# Patient Record
Sex: MALE | Race: BLACK OR AFRICAN AMERICAN
[De-identification: names, ages, dates, MRNs, and addresses within clinical notes are randomized per-mention and may not be internally consistent; named-entity substitution may affect disease eponyms.]

---

## 2019-08-03 ENCOUNTER — HOSPITAL ENCOUNTER (EMERGENCY)
Dept: HOSPITAL 97 - ER | Age: 39
Discharge: HOME | End: 2019-08-03
Payer: COMMERCIAL

## 2019-08-03 DIAGNOSIS — T78.40XA: ICD-10-CM

## 2019-08-03 DIAGNOSIS — R22.9: Primary | ICD-10-CM

## 2019-08-03 PROCEDURE — 96372 THER/PROPH/DIAG INJ SC/IM: CPT

## 2019-08-03 PROCEDURE — 99283 EMERGENCY DEPT VISIT LOW MDM: CPT

## 2019-08-03 NOTE — ER
Nurse's Notes                                                                                     

 [facility]                                                                 

Name: Medhat Salmon                                                                              

Age: 39 yrs                                                                                       

Sex: Male                                                                                         

: 1980                                                                                   

MRN: [id number]                                                                                   

Arrival Date: 2019                                                                          

Time: 20:33                                                                                       

Account#: [id number]                                                                            

Bed 14                                                                                            

Private MD:                                                                                       

Diagnosis: Allergic Reaction                                                                      

                                                                                                  

Presentation:                                                                                     

                                                                                             

20:37 Presenting complaint: Patient states: Bilateral eye itching and swelling that started   aj  

      just PTA. Transition of care: patient was not received from another setting of care.        

      Onset of symptoms was 2019. Risk Assessment: Do you want to hurt yourself or     

      someone else? Patient reports no desire to harm self or others. Initial Sepsis Screen:      

      Does the patient meet any 2 criteria? No. Patient's initial sepsis screen is negative.      

      Does the patient have a suspected source of infection? No. Patient's initial sepsis         

      screen is negative. Care prior to arrival: None.                                            

20:37 Method Of Arrival: Ambulatory                                                           aj  

20:37 Acuity: MAMTA 5                                                                           aj  

                                                                                                  

Triage Assessment:                                                                                

20:38 General: Appears in no apparent distress. comfortable, Behavior is calm, cooperative,   aj  

      appropriate for age. Pain: Denies pain. EENT: Lid(s) swollen bottom lid bilaterally.        

      Neuro: Level of Consciousness is awake, alert, obeys commands, Oriented to person,          

      place, time, situation, Appropriate for age. Respiratory: Airway is patent Trachea          

      midline Respiratory effort is even, unlabored, Respiratory pattern is regular,              

      symmetrical. Derm: Skin is intact, is healthy with good turgor, Skin is pink, warm \T\      

      dry. normal.                                                                                

                                                                                                  

Historical:                                                                                       

- Allergies:                                                                                      

20:38 No Known Allergies;                                                                     aj  

- Home Meds:                                                                                      

20:38 None [Active];                                                                          aj  

- PMHx:                                                                                           

20:38 None;                                                                                   aj  

- PSHx:                                                                                           

20:38 None;                                                                                   aj  

                                                                                                  

- Immunization history:: Adult Immunizations up to date.                                          

- Social history:: Smoking status: Patient/guardian denies using tobacco.                         

- Ebola Screening: : Patient negative for fever greater than or equal to 101.5 degrees            

  Fahrenheit, and additional compatible Ebola Virus Disease symptoms Patient denies               

  exposure to infectious person Patient denies travel to an Ebola-affected area in the            

  21 days before illness onset No symptoms or risks identified at this time.                      

                                                                                                  

                                                                                                  

Screenin:45 Abuse screen: Denies threats or abuse. Denies injuries from another. Nutritional        rr5 

      screening: No deficits noted. Tuberculosis screening: No symptoms or risk factors           

      identified. Fall Risk None identified. Total Ellis Fall Scale indicates No Risk (0-24       

      pts).                                                                                       

                                                                                                  

Assessment:                                                                                       

21:05 General: Appears in no apparent distress. uncomfortable, Behavior is calm, cooperative, rr5 

      appropriate for age. Pain: Denies pain. Neuro: Level of Consciousness is awake, alert,      

      obeys commands, Oriented to person, place, time, situation, Appropriate for age.            

      Cardiovascular: Capillary refill < 3 seconds Patient's skin is warm and dry.                

21:05 Respiratory: Airway is patent Respiratory effort is even, unlabored, Respiratory        rr5 

      pattern is regular, symmetrical, Denies shortness of breath. GI: No signs and/or            

      symptoms were reported involving the gastrointestinal system. : No signs and/or           

      symptoms were reported regarding the genitourinary system. EENT: Eyes swelling both         

      eyes. Derm: Skin is intact, Skin temperature is warm. Musculoskeletal: Circulation,         

      motion, and sensation intact. Capillary refill < 3 seconds.                                 

21:58 Reassessment: Patient appears in no apparent distress at this time. Patient is alert,   rr5 

      oriented x 3, equal unlabored respirations, skin warm/dry/pink. able to open eyes more      

      widely. denies any  or SOB. discharge instruction given and explained without            

      complaints made. Patient states symptoms have improved.                                     

                                                                                                  

Vital Signs:                                                                                      

20:38  / 75; Pulse 97; Resp 20; Temp 98.6; Pulse Ox 98% on R/A; Weight 117.93 kg;       aj  

      Height 5 ft. 8 in. (172.72 cm);                                                             

21:55  / 70; Pulse 90; Resp 16; Pulse Ox 99% on R/A;                                    rr5 

20:38 Body Mass Index 39.53 (117.93 kg, 172.72 cm)                                            aj  

                                                                                                  

ED Course:                                                                                        

20:33 Patient arrived in ED.                                                                  es  

20:38 Triage completed.                                                                       aj  

20:38 Arm band placed on right wrist. Patient placed in an exam room.                         aj  

20:40 Patient has correct armband on for positive identification. Bed in low position. Call   rr5 

      light in reach.                                                                             

20:57 Dex Hendrix PA is PHCP.                                                               [id number] 

20:57 Tomas Pineda MD is Attending Physician.                                             jr8 

21:06 Eliot Blanca RN is Primary Nurse.                                                    rr5 

22:01 No provider procedures requiring assistance completed. Patient did not have IV access   rr5 

      during this emergency room visit.                                                           

                                                                                                  

Administered Medications:                                                                         

21:28 Drug: Benadryl 50 mg Route: PO;                                                         rr5 

22:00 Follow up: Response: No adverse reaction                                                rr5 

21:28 Drug: SOLU-Medrol 125 mg Route: IM; Site: left gluteus;                                 rr5 

22:00 Follow up: Response: No adverse reaction                                                rr5 

                                                                                                  

                                                                                                  

Outcome:                                                                                          

21:39 Discharge ordered by MD.                                                                savannah 

22:00 Patient left the ED.                                                                    rr5 

22:00 Discharged to home ambulatory, with family.                                             rr5 

22:00 Condition: stable                                                                           

22:00 Discharge instructions given to patient, Instructed on discharge instructions, follow       

      up and referral plans. medication usage, Demonstrated understanding of instructions,        

      follow-up care, medications, Prescriptions given X 1.                                       

                                                                                                  

Signatures:                                                                                       

Mel Laboy, RN                       Sabine Ivy Josh, PA PA   jr8                                                  

Eliot Blanca RN                      RN   rr5                                                  

                                                                                                  

**************************************************************************************************

## 2019-08-03 NOTE — EDPHYS
Physician Documentation                                                                           

 [facility]                                                                 

Name: Medhat Salmon                                                                              

Age: 39 yrs                                                                                       

Sex: Male                                                                                         

: 1980                                                                                   

MRN: [id number]                                                                                   

Arrival Date: 2019                                                                          

Time: 20:33                                                                                       

Account#: [id number]                                                                            

Bed 14                                                                                            

Private MD:                                                                                       

ED Physician Tomas Pineda                                                                      

HPI:                                                                                              

                                                                                             

21:07 This 39 yrs old Black Male presents to ER via Ambulatory with complaints of Eye Problem.jr8 

21:07 The patient is experiencing redness, swelling. Onset: The symptoms/episode              jr8 

      began/occurred acutely, today. Duration: the symptoms are continuous. Aggravated by         

      nothing. Alleviated by nothing. Associated signs and symptoms: Pertinent positives:         

      None. Patient does not utilize any form of vision correction. Severity of symptoms: At      

      their worst the symptoms were mild in the emergency department the symptoms are             

      unchanged. The patient has not experienced similar symptoms in the past. The patient        

      has not recently seen a physician. Patient stated that he had finished dinner and were      

      on the way home. Stated that both of his eyes started to itch. Had rubbed them. Soon        

      after both immediately swelled. Mildly better now but still swollen. Stated that he had     

      Mexican food and shrimp for dinner. Has never had this in the past. Denies recent           

      illness or other symptoms .                                                                 

                                                                                                  

Historical:                                                                                       

- Allergies:                                                                                      

20:38 No Known Allergies;                                                                     aj  

- Home Meds:                                                                                      

20:38 None [Active];                                                                          aj  

- PMHx:                                                                                           

20:38 None;                                                                                   aj  

- PSHx:                                                                                           

20:38 None;                                                                                   aj  

                                                                                                  

- Immunization history:: Adult Immunizations up to date.                                          

- Social history:: Smoking status: Patient/guardian denies using tobacco.                         

- Ebola Screening: : Patient negative for fever greater than or equal to 101.5 degrees            

  Fahrenheit, and additional compatible Ebola Virus Disease symptoms Patient denies               

  exposure to infectious person Patient denies travel to an Ebola-affected area in the            

  21 days before illness onset No symptoms or risks identified at this time.                      

                                                                                                  

                                                                                                  

ROS:                                                                                              

21:07 ENT: Negative for injury, pain, and discharge, Neck: Negative for injury, pain, and     jr8 

      swelling, Cardiovascular: Negative for chest pain, palpitations, and edema,                 

      Respiratory: Negative for shortness of breath, cough, wheezing, and pleuritic chest         

      pain, Abdomen/GI: Negative for abdominal pain, nausea, vomiting, diarrhea, and              

      constipation, Back: Negative for injury and pain, MS/Extremity: Negative for injury and     

      deformity, Skin: Negative for injury, rash, and discoloration, Neuro: Negative for          

      headache, weakness, numbness, tingling, and seizure.                                        

21:07 Eyes: Positive for redness, swelling, of the right eye and left eye.                        

                                                                                                  

Exam:                                                                                             

21:07 Visual Acuity: Visual acuity is within normal limits.                                   jr8 

21:07 Head/Face:  Normocephalic, atraumatic. ENT:  Nares patent. No nasal discharge, no           

      septal abnormalities noted.  Tympanic membranes are normal and external auditory canals     

      are clear.  Oropharynx with no redness, swelling, or masses, exudates, or evidence of       

      obstruction, uvula midline.  Mucous membranes moist. Neck:  Trachea midline, no             

      thyromegaly or masses palpated, and no cervical lymphadenopathy.  Supple, full range of     

      motion without nuchal rigidity, or vertebral point tenderness.  No Meningismus.             

      Cardiovascular:  Regular rate and rhythm with a normal S1 and S2.  No gallops, murmurs,     

      or rubs.  Normal PMI, no JVD.  No pulse deficits. Respiratory:  Lungs have equal breath     

      sounds bilaterally, clear to auscultation and percussion.  No rales, rhonchi or wheezes     

      noted.  No increased work of breathing, no retractions or nasal flaring. Abdomen/GI:        

      Soft, non-tender, with normal bowel sounds.  No distension or tympany.  No guarding or      

      rebound.  No evidence of tenderness throughout. Back:  No spinal tenderness.  No            

      costovertebral tenderness.  Full range of motion. Skin:  Warm, dry with normal turgor.      

      Normal color with no rashes, no lesions, and no evidence of cellulitis. MS/ Extremity:      

      Pulses equal, no cyanosis.  Neurovascular intact.  Full, normal range of motion. Neuro:     

       Awake and alert, GCS 15, oriented to person, place, time, and situation.  Cranial          

      nerves II-XII grossly intact.  Motor strength 5/5 in all extremities.  Sensory grossly      

      intact.  Cerebellar exam normal.  Normal gait.                                              

21:07 Eyes: Periorbital structures: swelling, that is mild, on the  right lower eyelid, on        

      the  left lower eyelid, Pupils: equal, round, and reactive to light and accomodation,       

      Extraocular movements: intact throughout, Conjunctiva: injected, bilaterally, Corneas:      

      are normal, Sclera: no appreciated abnormality, Anterior chamber: normal, Lids and          

      lashes: appear normal.                                                                      

                                                                                                  

Vital Signs:                                                                                      

20:38  / 75; Pulse 97; Resp 20; Temp 98.6; Pulse Ox 98% on R/A; Weight 117.93 kg;       aj  

      Height 5 ft. 8 in. (172.72 cm);                                                             

21:55  / 70; Pulse 90; Resp 16; Pulse Ox 99% on R/A;                                    rr5 

20:38 Body Mass Index 39.53 (117.93 kg, 172.72 cm)                                            aj  

                                                                                                  

MDM:                                                                                              

20:57 Patient medically screened.                                                             jr8 

21:39 Data reviewed: vital signs, nurses notes, and as a result, I will discharge patient.    jr8 

      Data interpreted: Pulse oximetry: on room air is 98 %. Interpretation: normal.              

      Counseling: I had a detailed discussion with the patient and/or guardian regarding: the     

      historical points, exam findings, and any diagnostic results supporting the                 

      discharge/admit diagnosis, the need for outpatient follow up, a family practitioner, to     

      return to the emergency department if symptoms worsen or persist or if there are any        

      questions or concerns that arise at home.                                                   

                                                                                                  

Administered Medications:                                                                         

21:28 Drug: Benadryl 50 mg Route: PO;                                                         rr5 

22:00 Follow up: Response: No adverse reaction                                                rr5 

21:28 Drug: SOLU-Medrol 125 mg Route: IM; Site: left gluteus;                                 rr5 

22:00 Follow up: Response: No adverse reaction                                                rr5 

                                                                                                  

                                                                                                  

Disposition:                                                                                      

19 21:39 Discharged to Home. Impression: Allergic Reaction.                                 

- Condition is Stable.                                                                            

- Discharge Instructions: Anaphylactic Reaction, Adult.                                           

- Prescriptions for Prednisone 20 mg Oral Tablet - take 1 tablet by ORAL route once               

  daily for 5 days; 5 tablet.                                                                     

- Medication Reconciliation Form, Thank You Letter, Antibiotic Education, Prescription            

  Opioid Use form.                                                                                

- Follow up: Private Physician; When: 2 - 3 days; Reason: Recheck today's complaints,             

  Continuance of care, Re-evaluation by your physician.                                           

- Problem is new.                                                                                 

- Symptoms have improved.                                                                         

                                                                                                  

                                                                                                  

                                                                                                  

Addendum:                                                                                         

2019                                                                                        

     09:21 Co-signature as Attending Physician, Tomas Pineda MD I agree with the assessment and  c
ha

           plan of care.                                                                          

                                                                                                  

Signatures:                                                                                       

Mel Laboy RN                       Tomas Nunez MD MD cha Roszak, Josh, PA                        PA   jr8                                                  

Eliot Blanca RN                      RN   rr5                                                  

                                                                                                  

Corrections: (The following items were deleted from the chart)                                    

                                                                                             

22:00 21:39 2019 21:39 Discharged to Home. Impression: Allergic Reaction. Condition is  rr5 

      Stable. Forms are Medication Reconciliation Form, Thank You Letter, Antibiotic              

      Education, Prescription Opioid Use. Follow up: Private Physician; When: 2 - 3 days;         

      Reason: Recheck today's complaints, Continuance of care, Re-evaluation by your              

      physician. Problem is new. Symptoms have improved. jr8                                      

                                                                                                  

**************************************************************************************************